# Patient Record
Sex: MALE | Race: WHITE | NOT HISPANIC OR LATINO | Employment: FULL TIME | URBAN - METROPOLITAN AREA
[De-identification: names, ages, dates, MRNs, and addresses within clinical notes are randomized per-mention and may not be internally consistent; named-entity substitution may affect disease eponyms.]

---

## 2019-09-16 ENCOUNTER — HISTORICAL (OUTPATIENT)
Dept: ADMINISTRATIVE | Facility: HOSPITAL | Age: 24
End: 2019-09-16

## 2021-05-11 ENCOUNTER — HISTORICAL (OUTPATIENT)
Dept: ADMINISTRATIVE | Facility: HOSPITAL | Age: 26
End: 2021-05-11

## 2021-05-11 LAB
ABS NEUT (OLG): 5.5 X10(3)/MCL (ref 2.1–9.2)
ALBUMIN SERPL-MCNC: 5 GM/DL (ref 3.4–5)
ALBUMIN/GLOB SERPL: 1.85 {RATIO} (ref 1.5–2.5)
ALP SERPL-CCNC: 126 UNIT/L (ref 38–126)
ALT SERPL-CCNC: 13 UNIT/L (ref 7–52)
APPEARANCE, UA: CLEAR
AST SERPL-CCNC: 19 UNIT/L (ref 15–37)
BACTERIA #/AREA URNS AUTO: ABNORMAL /HPF
BILIRUB SERPL-MCNC: 0.4 MG/DL (ref 0.2–1)
BILIRUB UR QL STRIP: ABNORMAL MG/DL
BILIRUBIN DIRECT+TOT PNL SERPL-MCNC: 0.1 MG/DL (ref 0–0.5)
BILIRUBIN DIRECT+TOT PNL SERPL-MCNC: 0.3 MG/DL
BUN SERPL-MCNC: 15 MG/DL (ref 7–18)
CALCIUM SERPL-MCNC: 9.7 MG/DL (ref 8.5–10.1)
CHLORIDE SERPL-SCNC: 104 MMOL/L (ref 98–107)
CHOLEST SERPL-MCNC: 161 MG/DL (ref 0–200)
CHOLEST/HDLC SERPL: 3.6 {RATIO}
CO2 SERPL-SCNC: 27 MMOL/L (ref 21–32)
COLOR UR: YELLOW
CREAT SERPL-MCNC: 0.89 MG/DL (ref 0.6–1.3)
ERYTHROCYTE [DISTWIDTH] IN BLOOD BY AUTOMATED COUNT: 14.6 % (ref 11.5–17)
EST CREAT CLEARANCE SER (OHS): 115.03 ML/MIN
GLOBULIN SER-MCNC: 2.7 GM/DL (ref 1.2–3)
GLUCOSE (UA): NEGATIVE MG/DL
GLUCOSE SERPL-MCNC: 89 MG/DL (ref 74–106)
HCT VFR BLD AUTO: 46.1 % (ref 42–52)
HDLC SERPL-MCNC: 45 MG/DL (ref 35–60)
HGB BLD-MCNC: 15.5 GM/DL (ref 14–18)
HGB UR QL STRIP: ABNORMAL UNIT/L
KETONES UR QL STRIP: ABNORMAL MG/DL
LDLC SERPL CALC-MCNC: 93 MG/DL (ref 0–129)
LEUKOCYTE ESTERASE UR QL STRIP: NEGATIVE UNIT/L
LYMPHOCYTES # BLD AUTO: 1.7 X10(3)/MCL (ref 0.6–3.4)
LYMPHOCYTES NFR BLD AUTO: 20.7 % (ref 13–40)
MCH RBC QN AUTO: 31.1 PG (ref 27–31.2)
MCHC RBC AUTO-ENTMCNC: 34 GM/DL (ref 32–36)
MCV RBC AUTO: 92 FL (ref 80–94)
MONOCYTES # BLD AUTO: 1.1 X10(3)/MCL (ref 0.1–1.3)
MONOCYTES NFR BLD AUTO: 13.1 % (ref 0.1–24)
NEUTROPHILS NFR BLD AUTO: 66.2 % (ref 47–80)
NITRITE UR QL STRIP.AUTO: NEGATIVE
PH UR STRIP: 5.5 [PH]
PLATELET # BLD AUTO: 256 X10(3)/MCL (ref 130–400)
PMV BLD AUTO: 9 FL (ref 9.4–12.4)
POTASSIUM SERPL-SCNC: 4.4 MMOL/L (ref 3.5–5.1)
PROT SERPL-MCNC: 7.7 GM/DL (ref 6.4–8.2)
PROT UR QL STRIP: ABNORMAL MG/DL
RBC # BLD AUTO: 4.99 X10(6)/MCL (ref 4.7–6.1)
RBC #/AREA URNS HPF: ABNORMAL /HPF
SODIUM SERPL-SCNC: 139 MMOL/L (ref 136–145)
SP GR UR STRIP: >1.03
SQUAMOUS EPITHELIAL, UA: ABNORMAL /LPF
TRIGL SERPL-MCNC: 318 MG/DL (ref 30–150)
UROBILINOGEN UR STRIP-ACNC: 0.2 MG/DL
VLDLC SERPL CALC-MCNC: 63.6 MG/DL
WBC # SPEC AUTO: 8.3 X10(3)/MCL (ref 4.5–11.5)
WBC #/AREA URNS AUTO: ABNORMAL /[HPF]

## 2022-04-09 ENCOUNTER — HISTORICAL (OUTPATIENT)
Dept: ADMINISTRATIVE | Facility: HOSPITAL | Age: 27
End: 2022-04-09

## 2022-04-29 VITALS
WEIGHT: 138.88 LBS | SYSTOLIC BLOOD PRESSURE: 100 MMHG | DIASTOLIC BLOOD PRESSURE: 50 MMHG | HEIGHT: 66 IN | SYSTOLIC BLOOD PRESSURE: 130 MMHG | WEIGHT: 138.88 LBS | DIASTOLIC BLOOD PRESSURE: 60 MMHG | BODY MASS INDEX: 22.32 KG/M2 | HEIGHT: 66 IN | BODY MASS INDEX: 22.32 KG/M2

## 2022-05-02 NOTE — HISTORICAL [facility]ER
This is a historical note converted from Herminia. Formatting and pictures may have been removed.  Please reference Herminia for original formatting and attached multimedia. Chief Complaint  CPX/FASTING  History of Present Illness  Patient is here today for wellness CPX.? He is currently seeing?Dr. Britton nurse practitioner?who is treating him for depression, ADD and opioid dependence.? He is currently on Vivitrol?injection, Vyvanse?30 mg and taking Prozac 40 mg.??He does report decreased appetite since starting Vyvanse?but overall is tolerating all medications without problems.? He denies any chest pain, palpitations?or any history of heart disease.? His?psychiatric practitioner?wanted him to do a wellness CPX?with routine lab work.  Review of Systems  GENERAL:?no? unexplained wt ?loss?12lb due to vyvanse s ,no ?fever, fatigue, chills, night sweats or ?weakness  HEENT: no?? sore throat, ?ear pain, ?sinus pressure, ?nasal congestion, or ?rhinorrhea  VISION:?no ?vision changes, ?glaucoma, cataracts  CARDIAC: no? chest pain,??palpitations,?Dyspnea on exertion, ?orthopnea  RESPIRATORY:?no??cough,?wheezing, sputum production or?SOB  GI: no???abdominal pain, n&v,?constipation, diarrhea,??blood in stool or_?no family history of colon cancer?_  :?no? dysuria, ?hematuria, ?frequency, urgency, ?incontinence,? testicular pain/swelling,?_no ?family history of prostate cancer_  [facility]:? no? myalgia, ?weakness, edema,? + left shoulder arthralgia, or ?joint effusion  SKIN:? No?rash, hives,?itching or?sores  NEURO:? No?headaches, numbness, tingling,? weakness, or ?dizziness  PSYCH:??mild ?anxiety, + depression--sees Psychiatric NP , no ?irritability, ?suicidal ideation or hallucinations  ENDO:? No ?polyuria, + ?polydipsia, ?polyphagia  HEME:? No Bruising, lymphadenopathy, bleeding disorders ?or?signs of anemia  Physical Exam  Vitals & Measurements  HR:?56(Peripheral)? BP:?130/60?  HT:?167.60?cm? WT:?63.000?kg? BMI:?22.43?  GENERAL:  NAD, alert and oriented x 3  SKIN:? no rash or abnormal appearing skin lesions  HEENT:? PERRLA, EOMI, mouth wnl, throat wnl, EAC and TM wnl bilaterally  NECK:? FROM, no lymphadenopathy, no thyroid abnormalities palpable  CHEST:? CTA bilaterally no wheezes, crackles or rubs  CARDIAC:? RRR, no murmurs audible  ABDOMEN:? Soft, nontender, nondistended, NBSx4,?no rebound or guarding, no HSM  EXTREMITIES:? no clubbing, cyanosis, or edema.? joints wnl. +2 DP/PT pulse bilaterally  NEURO:? no sensory or motor deficits noted. CN II-XII intact. Gait wnl.?  GENITAL: normal testes, no hernia  ?  Assessment/Plan  1.?Wellness examination?Z00.00  d/c smoking--?CBC, CMP, FLP, U/A, Encourage pt to increase cardiovascular exercise and attempt to obtain at least 150 minutes of moderate aerobic exercise per week or 75 minutes of vigorous aerobic exercise weekly.  Ordered:  CBC w/ Auto Diff, Routine collect, 05/11/21 13:49:00 CDT, Blood, Order for future visit, Stop date 05/11/21 13:49:00 CDT, Lab Collect, Wellness examination  Opioid dependence  Depression, Genesis BONDS-Magdalene GARCIA, 05/11/21 13:49:00 CDT  Clinic Follow-up PRN, 05/11/21 13:49:00 CDT, [facility] - [facility], Future Order  Comprehensive Metabolic Panel, Routine collect, 05/11/21 13:49:00 CDT, Blood, Order for future visit, Stop date 05/11/21 13:49:00 CDT, Lab Collect, Wellness examination  Opioid dependence  Depression, Magdalene Jacob, 05/11/21 13:49:00 CDT  Lab Collection Request, 05/11/21 13:49:00 CDT, [facility] - [facility], 05/11/21 13:49:00 CDT, Wellness examination  Opioid dependence  Depression  Lipid Panel, Routine collect, 05/11/21 13:49:00 CDT, Blood, Order for future visit, Stop date 05/11/21 13:49:00 CDT, Lab Collect, Wellness examination  Opioid dependence  Depression, Cynthia Jacobey H, 05/11/21 13:49:00 CDT  Preventative Health Care Est 18-39 years [id number] PC, Wellness examination  Opioid dependence  Depression, [facility] AMB - [facility], 05/11/21  13:49:00 CDT  Urinalysis no Reflex, Routine collect, Urine, Order for future visit, 05/11/21 13:49:00 CDT, Stop date 05/11/21 13:49:00 CDT, Nurse collect, Wellness examination  Opioid dependence  Depression, Genesis JIMENEZP-C, Magdalene BISHOP  ?  2.?Encounter for immunization?Z23  ?Tdap-decline  ?  3.?Opioid dependence?F11.20  ?sees Dr PHILLIPS NP  Ordered:  CBC w/ Auto Diff, Routine collect, 05/11/21 13:49:00 CDT, Blood, Order for future visit, Stop date 05/11/21 13:49:00 CDT, Lab Collect, Wellness examination  Opioid dependence  Depression, Genesis JIMENEZP-C, Magdalene BISHOP, 05/11/21 13:49:00 CDT  Clinic Follow-up PRN, 05/11/21 13:49:00 CDT, [facility] AM[facility] - [facility], Future Order  Comprehensive Metabolic Panel, Routine collect, 05/11/21 13:49:00 CDT, Blood, Order for future visit, Stop date 05/11/21 13:49:00 CDT, Lab Collect, Wellness examination  Opioid dependence  Depression, Genesis FNP-C, Magdalene H, 05/11/21 13:49:00 CDT  Lab Collection Request, 05/11/21 13:49:00 CDT, [facility] AMB - [facility], 05/11/21 13:49:00 CDT, Wellness examination  Opioid dependence  Depression  Lipid Panel, Routine collect, 05/11/21 13:49:00 CDT, Blood, Order for future visit, Stop date 05/11/21 13:49:00 CDT, Lab Collect, Wellness examination  Opioid dependence  Depression, Genesis FNP-C, Magdalene H, 05/11/21 13:49:00 CDT  Preventative Health Care Est 18-39 years [id number] PC, Wellness examination  Opioid dependence  Depression, [facility] AMB - [facility], 05/11/21 13:49:00 CDT  Urinalysis no Reflex, Routine collect, Urine, Order for future visit, 05/11/21 13:49:00 CDT, Stop date 05/11/21 13:49:00 CDT, Nurse collect, Wellness examination  Opioid dependence  Depression, Genesis BAEZ, Magdalene BISHOP  ?  4.?Depression?F32.9  sees Dr PHILLIPS NP  Ordered:  CBC w/ Auto Diff, Routine collect, 05/11/21 13:49:00 CDT, Blood, Order for future visit, Stop date 05/11/21 13:49:00 CDT, Lab Collect, Wellness examination  Opioid dependence  Depression, Magdalene Jacob,  05/11/21 13:49:00 CDT  Clinic Follow-up PRN, 05/11/21 13:49:00 CDT, [facility] AMB - [facility], Future Order  Comprehensive Metabolic Panel, Routine collect, 05/11/21 13:49:00 CDT, Blood, Order for future visit, Stop date 05/11/21 13:49:00 CDT, Lab Collect, Wellness examination  Opioid dependence  Depression, Genesis BAEZ, Magdalene BISHOP, 05/11/21 13:49:00 CDT  Lab Collection Request, 05/11/21 13:49:00 CDT, [facility] AMB - [facility], 05/11/21 13:49:00 CDT, Wellness examination  Opioid dependence  Depression  Lipid Panel, Routine collect, 05/11/21 13:49:00 CDT, Blood, Order for future visit, Stop date 05/11/21 13:49:00 CDT, Lab Collect, Wellness examination  Opioid dependence  Depression, Genesis BAEZ, Magdalene BISHOP, 05/11/21 13:49:00 CDT  [facility] Care Est 18-39 years [address] PC, Wellness examination  Opioid dependence  Depression, [facility] AMB - [facility], 05/11/21 13:49:00 CDT  Urinalysis no Reflex, Routine collect, Urine, Order for future visit, 05/11/21 13:49:00 CDT, Stop date 05/11/21 13:49:00 CDT, Nurse collect, Wellness examination  Opioid dependence  Depression, Genesis BAEZ, Magdalene BISHOP  ?  Referrals  Clinic Follow-up PRN, 05/11/21 13:49:00 CDT, [facility] AMB - [facility], Future Order   Problem List/Past Medical History  Ongoing  Chest pain  Esophagitis  Historical  ADHD - Attention deficit disorder with hyperactivity  Depression  Opioid dependence  Procedure/Surgical History  left ankle bone biopsy (2008)  Tonsillectomy   Medications  FLUoxetine 40 mg oral capsule, 40 mg= 1 cap(s), Oral, Daily  traZODONE 150 mg oral tab ( Desyrel ), 150 mg= 1 tab(s), Oral, Once a day (at bedtime), 5 refills  Vivitrol 380 mg intramuscular injection, extended release, 380 mg= 380 mg, IM, q4wk  Vyvanse 30 mg oral capsule, 30 mg= 1 cap(s), Oral, qAM  Allergies  penicillins?(-)  Social History  Abuse/Neglect  No, 05/11/2021  No, 09/10/2020  No, 07/29/2020  No, 09/16/2019  Alcohol  Wine, 1-2 times per month, 05/11/2021  Current, 05/11/2021  Past,  07/29/2020  Employment/School  Employed, Work/School description: ., 05/11/2021  Home/Environment  Lives with Alone., 05/11/2021  Substance Use  Past, Heroin, 05/11/2021  Tobacco  10 or more cigarettes (1/2 pack or more)/day in last 30 days, N/A, 05/11/2021  10 or more cigarettes (1/2 pack or more)/day in last 30 days, N/A, 09/10/2020  10 or more cigarettes (1/2 pack or more)/day in last 30 days, N/A, 07/29/2020  10 or more cigarettes (1/2 pack or more)/day in last 30 days, N/A, 09/16/2019  Family History  Healthy adult: Mother and Father.  Psoriasis: Mother.  Valvular heart disease: Brother.  Immunizations  Vaccine Date Status   varicella virus vaccine 07/11/2002 Recorded   poliovirus vaccine, inactivated 07/11/2002 Recorded   measles/mumps/rubella virus vaccine 07/11/2002 Recorded   diphtheria/pertussis, acel/tetanus ped 07/11/2002 Recorded   poliovirus vaccine, live, trivalent 11/02/1998 Recorded   measles/mumps/rubella virus vaccine 11/02/1998 Recorded   hepatitis B pediatric vaccine 04/06/1998 Recorded   poliovirus vaccine, inactivated 01/20/1998 Recorded   poliovirus vaccine, inactivated 1997 Recorded   hepatitis B pediatric vaccine 1997 Recorded   hepatitis B pediatric vaccine 1997 Recorded   Health Maintenance  Health Maintenance  ???Pending?(in the next year)  ??? ??OverDue  ??? ? ? ?Influenza Vaccine due??10/01/20??and every 1??day(s)  ??? ? ? ?Alcohol Misuse Screening due??01/02/21??and every 1??year(s)  ??? ??Due?  ??? ? ? ?ADL Screening due??05/11/21??and every 1??year(s)  ??? ? ? ?Tetanus Vaccine due??05/11/21??and every 10??year(s)  ??? ??Due In Future?  ??? ? ? ?Obesity Screening not due until??01/01/22??and every 1??year(s)  ??? ? ? ?Smoking Cessation not due until??01/01/22??and every 1??year(s)  ???Satisfied?(in the past 1 year)  ??? ??Satisfied?  ??? ? ? ?Blood Pressure Screening on??05/11/21.??Satisfied by Jo Quiñones CMA  ??? ? ? ?Body Mass Index Check  on??05/11/21.??Satisfied by Jo Quiñones CMA  ??? ? ? ?Obesity Screening on??05/11/21.??Satisfied by Jo Quiñones CMA  ??? ? ? ?Smoking Cessation on??05/11/21.??Satisfied by Gene Sales MD  ?

## 2022-05-02 NOTE — HISTORICAL [facility]
This is a historical note converted from Herminia. Formatting and pictures may have been removed.  Please reference Herminia for original formatting and attached multimedia. Chief Complaint  PT C/O CHEST PAIN AFTER SWALLOWING FOOD OR WHEN LYING DOWN.  History of Present Illness  Pt is here today with 1 month history of chest discomfort.? He has been having sharp chest pain at times.? He would like a chest Xray because he vapes.? He does have periodic heartburn during the day.? Occasional discomfort with swallowing food.? Symptoms seem worse when lying down.? No palpitations.? No SOB.? No Cough.  Review of Systems  general: no fever, + smoker, occasional vape nicotine only,  HEENT: no PND, no ST  Respiratory: no Cough, no wheezing, + vape user  Cardiac: no palpitations, no exertional CP  GI: + frequent GERD lately improved with otc meds during daytime  : NC  Physical Exam  Vitals & Measurements  HR:?60(Peripheral)? BP:?100/50?  HT:?167.6?cm? WT:?63?kg? BMI:?22.43?  GENERAL:? NAD  HEENT:? mucous membranes??moist  CHEST:? CTA bilaterally  CARDIAC:??regular??rate and regular?rhythm, ?no murmurs audible  ABDOMEN:??soft,??notenderness,?normalbowel soundsx4, ?norebound, ?no?guarding  Assessment/Plan  1.?Chest pain?R07.9  ?CXR- neg -today--suspect due to GERD  Ordered:  Office/Outpatient Visit Level 3 Established [id number] PC, Chest pain  Esophagitis, INK AMB - [facility], 09/16/19 16:46:00 CDT  ?  2.?Esophagitis?K20.9  ?begin omeprazole 40 mg q?d--if no relief in 2-3 weeks pt will call for for EGD referral.  Ordered:  Office/Outpatient Visit Level 3 Established [id number] PC, Chest pain  Esophagitis, [facility] AMB - [facility], 09/16/19 16:46:00 CDT  ?  Orders:  omeprazole, 40 mg = 1 cap(s), Oral, Daily, # 30 cap(s), 0 Refill(s), Pharmacy: [id number]  Clinic Follow-up PRN, 09/16/19 16:46:00 CDT, [facility] AMB - [facility], Future Order  ADD-? UDS  Referrals  Clinic Follow-up PRN, 09/16/19 16:46:00 BABS RODRIGSE [facility], Future Order   Problem  List/Past Medical History  Ongoing  Chest pain  Esophagitis  Historical  No qualifying data  Procedure/Surgical History  left ankle bone biopsy (2008)  Tonsillectomy   Medications  omeprazole 40 mg oral DR capsule, 40 mg= 1 cap(s), Oral, Daily  Allergies  penicillins?(-)  Social History  Abuse/Neglect  No, 09/16/2019  Tobacco  10 or more cigarettes (1/2 pack or more)/day in last 30 days, N/A, 09/16/2019  Family History  Healthy adult: Mother and Father.  Health Maintenance  Health Maintenance  ???Pending?(in the next year)  ??? ??OverDue  ??? ? ? ?Alcohol Misuse Screening due??01/01/19??and every 1??year(s)  ??? ??Due?  ??? ? ? ?ADL Screening due??09/16/19??and every 1??year(s)  ??? ? ? ?Tetanus Vaccine due??09/16/19??and every 10??year(s)  ??? ??Due In Future?  ??? ? ? ?Obesity Screening not due until??01/01/20??and every 1??year(s)  ???Satisfied?(in the past 1 year)  ??? ??Satisfied?  ??? ? ? ?Blood Pressure Screening on??09/16/19.??Satisfied by Jo Quiñones CMA  ??? ? ? ?Body Mass Index Check on??09/16/19.??Satisfied by Jo Quiñones CMA  ??? ? ? ?Influenza Vaccine on??09/16/19.??Satisfied by Jo Quiñones CMA  ??? ? ? ?Obesity Screening on??09/16/19.??Satisfied by Jo Quiñones CMA  ?

## 2022-05-13 PROBLEM — F11.11 HISTORY OF OPIOID ABUSE: Status: ACTIVE | Noted: 2022-05-13

## 2022-05-13 PROBLEM — F32.A DEPRESSION: Status: ACTIVE | Noted: 2022-05-13

## 2022-05-13 PROBLEM — Z00.00 WELLNESS EXAMINATION: Status: ACTIVE | Noted: 2022-05-13

## 2022-08-15 PROBLEM — Z00.00 WELLNESS EXAMINATION: Status: RESOLVED | Noted: 2022-05-13 | Resolved: 2022-08-15

## 2024-06-22 ENCOUNTER — HOSPITAL ENCOUNTER (EMERGENCY)
Facility: HOSPITAL | Age: 29
Discharge: HOME | End: 2024-06-22
Attending: STUDENT IN AN ORGANIZED HEALTH CARE EDUCATION/TRAINING PROGRAM
Payer: COMMERCIAL

## 2024-06-22 VITALS
OXYGEN SATURATION: 97 % | TEMPERATURE: 99 F | DIASTOLIC BLOOD PRESSURE: 76 MMHG | BODY MASS INDEX: 23.54 KG/M2 | HEART RATE: 67 BPM | RESPIRATION RATE: 16 BRPM | SYSTOLIC BLOOD PRESSURE: 120 MMHG | HEIGHT: 65 IN | WEIGHT: 141.31 LBS

## 2024-06-22 DIAGNOSIS — M25.511 RIGHT SHOULDER PAIN: ICD-10-CM
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
## 2024-06-22 DIAGNOSIS — M79.18 MUSCULOSKELETAL PAIN: ICD-10-CM

## 2024-06-22 PROCEDURE — 96372 THER/PROPH/DIAG INJ SC/IM: CPT | Performed by: PHYSICIAN ASSISTANT

## 2024-06-22 PROCEDURE — 99284 EMERGENCY DEPT VISIT MOD MDM: CPT | Mod: 25

## 2024-06-22 PROCEDURE — 63600175 PHARM REV CODE 636 W HCPCS: Performed by: PHYSICIAN ASSISTANT

## 2024-06-22 RX ORDER — METHOCARBAMOL 500 MG/1
500 TABLET, FILM COATED ORAL 3 TIMES DAILY PRN
Qty: 15 TABLET | Refills: 0 | Status: SHIPPED | OUTPATIENT
Start: 2024-06-22 | End: 2024-06-27

## 2024-06-22 RX ORDER — DICLOFENAC SODIUM 75 MG/1
75 TABLET, DELAYED RELEASE ORAL 2 TIMES DAILY PRN
Qty: 30 TABLET | Refills: 0 | Status: SHIPPED | OUTPATIENT
Start: 2024-06-22

## 2024-06-22 RX ORDER — KETOROLAC TROMETHAMINE 30 MG/ML
30 INJECTION, SOLUTION INTRAMUSCULAR; INTRAVENOUS
Status: COMPLETED | OUTPATIENT
Start: 2024-06-22 | End: 2024-06-22

## 2024-06-22 RX ADMIN — KETOROLAC TROMETHAMINE 30 MG: 30 INJECTION, SOLUTION INTRAMUSCULAR at 08:06
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
## 2024-06-23 NOTE — DISCHARGE INSTRUCTIONS
Take medications as prescribed as needed for pain with food and plenty of water.   Apply heating pad to sore muscles ( being careful not to burn skin).  Soak in warm epsom salt baths for 20-30 minutes daily to help with sore and aching muscles.  Avoid lifting heavy.  Follow up with your doctor within 2-3 days and return if symptoms worsen.